# Patient Record
Sex: MALE | Race: OTHER | NOT HISPANIC OR LATINO | URBAN - METROPOLITAN AREA
[De-identification: names, ages, dates, MRNs, and addresses within clinical notes are randomized per-mention and may not be internally consistent; named-entity substitution may affect disease eponyms.]

---

## 2023-08-14 ENCOUNTER — EMERGENCY (EMERGENCY)
Facility: HOSPITAL | Age: 43
LOS: 1 days | Discharge: HOME | End: 2023-08-14
Attending: EMERGENCY MEDICINE
Payer: COMMERCIAL

## 2023-08-14 VITALS
TEMPERATURE: 98 F | WEIGHT: 169.09 LBS | RESPIRATION RATE: 16 BRPM | SYSTOLIC BLOOD PRESSURE: 135 MMHG | OXYGEN SATURATION: 98 % | HEART RATE: 62 BPM | HEIGHT: 68 IN | DIASTOLIC BLOOD PRESSURE: 84 MMHG

## 2023-08-14 PROCEDURE — 73130 X-RAY EXAM OF HAND: CPT

## 2023-08-14 PROCEDURE — 99284 EMERGENCY DEPT VISIT MOD MDM: CPT | Mod: 25

## 2023-08-14 PROCEDURE — 73130 X-RAY EXAM OF HAND: CPT | Mod: 26,RT

## 2023-08-14 PROCEDURE — 29125 APPL SHORT ARM SPLINT STATIC: CPT | Mod: RT

## 2023-08-14 PROCEDURE — 73140 X-RAY EXAM OF FINGER(S): CPT

## 2023-08-14 PROCEDURE — 73140 X-RAY EXAM OF FINGER(S): CPT | Mod: 26,59,RT

## 2023-08-14 NOTE — ED ADULT TRIAGE NOTE - CHIEF COMPLAINT QUOTE
As per pt, c/o R thumb pain w/ selling +2 s/p pushing something down at work today. No other obvious traumas noted. Pt denies all other symptoms.

## 2023-08-14 NOTE — ED PROVIDER NOTE - NSFOLLOWUPINSTRUCTIONS_ED_ALL_ED_FT
Follow up with the hand orthopedist within 1 week.  For pain you can take over the counter Ibuprofen 600 mg orally every 6 hours as needed for pain. Take medication with food.   If you experience any new or worsening symptoms or if you are concerned you can always come back to the emergency for a re-evaluation.

## 2023-08-14 NOTE — ED PROVIDER NOTE - PATIENT PORTAL LINK FT
You can access the FollowMyHealth Patient Portal offered by [facility] by registering at the following website: [email or web address]. By joining [employer]’s FollowMyHealth portal, you will also be able to view your health information using other applications (apps) compatible with our system.

## 2023-08-14 NOTE — ED PROVIDER NOTE - PHYSICAL EXAMINATION
Right thumb swan-neck deformity.  Flexion and extension at the interphalangeal joint intact.  Unable to actively flex MCPJ.  Unable to do opposition of thumb and pinky finger.  Mild bony tenderness at the MCP J.  Full passive range of motion of the MCP J.  Cap refill less than 2 seconds.

## 2023-08-14 NOTE — ED PROVIDER NOTE - OBJECTIVE STATEMENT
42-year-old male, no significant past medical history, presents for evaluation status post right thumb injury earlier today.  He was trying to pry something really hard and felt a snap sensation and since then has difficulty moving his leg, focal weakness or any other complaints.

## 2023-08-18 ENCOUNTER — APPOINTMENT (OUTPATIENT)
Dept: ORTHOPEDIC SURGERY | Facility: CLINIC | Age: 43
End: 2023-08-18
Payer: OTHER MISCELLANEOUS

## 2023-08-18 PROBLEM — Z00.00 ENCOUNTER FOR PREVENTIVE HEALTH EXAMINATION: Status: ACTIVE | Noted: 2023-08-18

## 2023-08-18 PROCEDURE — L3809: CPT | Mod: RT

## 2023-08-18 PROCEDURE — 99203 OFFICE O/P NEW LOW 30 MIN: CPT

## 2023-08-18 NOTE — IMAGING
[de-identified] : Hyperextension deformity of R thumb MCP joint Unable to actively flex MCP + [employer]

## 2023-08-18 NOTE — HISTORY OF PRESENT ILLNESS
[Not working due to injury] : Work status: not working due to injury [de-identified] : 08/18/2023  ALBERTO 42 year M is here for: Location: Right Thumb  Injury: pt states his hand was on a pile of linen when it was pulled and his thumb went backwards. pt went to [facility] on [facility].  Date of Injury: 08/14/23  Prior treatments: [facility] +xrays  Hand dominance: RIGHT  Occupation: Linen maker   [] : no [de-identified] : 08/14/23 [de-identified] : [employer]LO[employer] MCC[employer]  [de-identified] : xrays

## 2023-08-21 ENCOUNTER — APPOINTMENT (OUTPATIENT)
Dept: MRI IMAGING | Facility: CLINIC | Age: 43
End: 2023-08-21
Payer: OTHER MISCELLANEOUS

## 2023-08-21 PROCEDURE — 73218 MRI UPPER EXTREMITY W/O DYE: CPT | Mod: RT

## 2023-09-01 ENCOUNTER — APPOINTMENT (OUTPATIENT)
Dept: ORTHOPEDIC SURGERY | Facility: CLINIC | Age: 43
End: 2023-09-01
Payer: OTHER MISCELLANEOUS

## 2023-09-01 PROCEDURE — 99213 OFFICE O/P EST LOW 20 MIN: CPT

## 2023-09-01 NOTE — HISTORY OF PRESENT ILLNESS
[Not working due to injury] : Work status: not working due to injury [de-identified] : 08/18/2023  ALBERTO 42 year M is here for: Location: Right Thumb  Injury: pt states his hand was on a pile of linen when it was pulled and his thumb went backwards. pt went to  on [facility].  Date of Injury: 08/14/23  Prior treatments: [facility] +xrays  Hand dominance: RIGHT  Occupation: Linen maker    09/01/2023 ALBERTO 42-year M is here for Right Thumb, presented herself himself at the office today for STAT MRI Results of the Right Thumb, states no changes since last visit.  [Work related] : work related [6] : 6 [] : no [FreeTextEntry3] : 08/14/2023 [de-identified] : 08/14/23 [de-identified] : [employer]LO[employer] MCCANN  [de-identified] : xrays

## 2023-09-01 NOTE — IMAGING
[de-identified] : Hyperextension deformity of R thumb MCP joint Unable to actively flex MCP + [employer]

## 2023-09-01 NOTE — REASON FOR VISIT
[Family Member] : family member [FreeTextEntry2] :  Follow Up: STAT MRI Results Right Thumb [facility]: 08/14/2023

## 2023-09-18 ENCOUNTER — APPOINTMENT (OUTPATIENT)
Dept: ORTHOPEDIC SURGERY | Facility: CLINIC | Age: 43
End: 2023-09-18
Payer: OTHER MISCELLANEOUS

## 2023-09-18 PROCEDURE — 99213 OFFICE O/P EST LOW 20 MIN: CPT

## 2023-10-16 ENCOUNTER — APPOINTMENT (OUTPATIENT)
Dept: ORTHOPEDIC SURGERY | Facility: CLINIC | Age: 43
End: 2023-10-16
Payer: OTHER MISCELLANEOUS

## 2023-10-16 DIAGNOSIS — S63.659A SPRAIN OF METACARPOPHALANGEAL JOINT OF UNSPECIFIED FINGER, INITIAL ENCOUNTER: ICD-10-CM

## 2023-10-16 PROCEDURE — 99213 OFFICE O/P EST LOW 20 MIN: CPT

## 2023-10-17 PROBLEM — S63.659A METACARPOPHALANGEAL JOINT SPRAIN: Status: ACTIVE | Noted: 2023-08-18

## 2023-11-13 ENCOUNTER — APPOINTMENT (OUTPATIENT)
Dept: ORTHOPEDIC SURGERY | Facility: CLINIC | Age: 43
End: 2023-11-13